# Patient Record
(demographics unavailable — no encounter records)

---

## 2025-02-10 NOTE — PLAN
[FreeTextEntry1] : 45 year old  female presents for annual.  -Pap/HPV -Referred for mammo/sono (due in April) -Advised colonoscopy -Offered pelvic floor PT, pt declines at this time  F/u in 1 yr.

## 2025-02-10 NOTE — END OF VISIT
[FreeTextEntry3] :  I, Cleopatra Link, acted as a scribe on behalf of Dr. Toshia Humphreys M.D. on 02/10/2025.   All medical entries made by the scribe were at my, Dr. Toshia Humphreys M.D., direction and personally dictated by me on 02/10/2025. I have reviewed the chart and agree that the record accurately reflects my personal performance of the history, physical exam, assessment and plan. I have also personally directed, reviewed, and agreed with the chart.
ambulatory

## 2025-02-10 NOTE — HISTORY OF PRESENT ILLNESS
[FreeTextEntry1] :  45 year old  female presents for annual. Last seen in 2024 and had negative pap/HPV. Was rx vaginal estrogen at time of last visit. Had mammo in 2024. C/o mixed urinary incontinence. Notes has not needed vaginal estrogen. States periods are somewhat irregular. Notes her  works for [employer] and lives in [place].   OBHx:  x2, TOP x1 GYNHx: h/o HPV+  PSHx; laser eye surgery, cystectomy  FMHx: DM-father, prostate cancer-PGF  Allergies: NKDA [Mammogramdate] : 4/2024 [PapSmeardate] : 1/2024

## 2025-06-06 NOTE — END OF VISIT
[FreeTextEntry3] : I, Toshia Jerez, acted as a scribe on behalf of Dr. Toshia Humphreys M.D. on 06/06/2025 .   All medical entries made by the scribe were at my Dr. Toshia Humphreys M.D direction and personally dictated by me on  06/06/2025 . I have reviewed the chart and agree that the record accurately reflects my personal performance of the history, physical exam, assessment and plan. I have also personally directed, reviewed, and agreed with the chart.

## 2025-06-06 NOTE — HISTORY OF PRESENT ILLNESS
[FreeTextEntry1] : 44 y/o  female presents to discuss MHT. Last seen Feb. for annual exam. She reports the last real period she had was in 2025. In April she had one day of spotting. She complains of sudden onset joint pain, poor sleep, hot flashes and mood irritability. She saw her PCP who referred her to a Rheumatologist . Pt will be moving to [place] at the end of the month.   She denies smoking and denies Famhx of VTE.

## 2025-06-06 NOTE — PLAN
[FreeTextEntry1] : 46 y/o  female presents to discuss MHT.  Discussed option of MHT to treat symptoms of menopause.  I counseled patient regarding risks of venous thromboembolic events including DVT, PE and stroke, as well as other cardiovascular complication and increased risk of breast cancer.  I recommended using the lowest effective dose for the shortest duration to manage symptoms.  I counseled patient on the need to constantly re-evaluate risk based on new medical problems that may arise.  -Start MHT  -F.u 6-8 weeks.